# Patient Record
Sex: MALE | URBAN - METROPOLITAN AREA
[De-identification: names, ages, dates, MRNs, and addresses within clinical notes are randomized per-mention and may not be internally consistent; named-entity substitution may affect disease eponyms.]

---

## 2019-09-28 ENCOUNTER — HOSPITAL ENCOUNTER (OUTPATIENT)
Dept: MEDSURG UNIT | Facility: HOSPITAL | Age: 59
End: 2019-09-30
Attending: INTERNAL MEDICINE | Admitting: INTERNAL MEDICINE

## 2022-05-01 NOTE — HISTORICAL [facility]
This is a historical note converted from Ruth. Formatting and pictures may have been removed.  Please reference Ruth for original formatting and attached multimedia. Admit and Discharge Dates  Admit Date: 09/28/2019  Discharge Date: 09/30/2019  Physicians  Attending Physician - Daniel TRENT, Candido  Admitting Physician - Daniel TRENT, Candido  Primary Care Physician - No PCP, No  Discharge Diagnosis  1.?Acute streptococcal pharyngitis?J02.0  2.?Angioedema?T78.3XXA  3.?Weakness of right upper extremity?R29.898  4.?Allergic reaction - minor?[id number]  5.?Essential hypertension?I10  6.?Type 2 diabetes mellitus?E11.9  CVA (cerebrovascular accident)?I63.9  Surgical Procedures  No procedures recorded for this visit.  Immunizations  No immunizations recorded for this visit.  Hospital Course  57yo male admitted with swelling of his throat and weakness.? he was on an antibiotic when these symptoms started but they progressed causing him to come here for further evaluation.? He was + for Strept.? He was also c/o right arm weakness and an abnormal balance.? CT head was negative but an MRI of brain was + for a CVA.? MRI showed a left basal ganglia region with extension into the left periventricular region consistent with acute ischemia.? He does have h/i DM, Hyperlipidemia, and HTN.? His BP was accelerated upon admit. Apparently he has not been fully compliant with meds.? PT consulted and felt he was safe to return home.? They did instruct the patient on the proper exercises because he does not have insurance and therefore a rehab unit will not be possible.? he is cleared for discharge home today.  Time Spent on discharge  D/C=38mins  Objective  Vitals & Measurements  T:?36.7? ?C (Oral)? TMIN:?36.6? ?C (Oral)? TMAX:?36.8? ?C (Oral)? HR:?70(Peripheral)? RR:?20? BP:?128/74? SpO2:?99%?  Physical Exam  General: ?Alert and oriented, No acute distress. ?  ??????? ? Appearance: Well nourished. ?  ??????? ?  Behavior: Appropriate. ?  ??????? ? Skin: Normal for ethnicity. ?  ?????Eye: ?Pupils are equal, round and reactive to light, Extraocular movements are intact. ?  ?????HENT: ?Normocephalic, Normal hearing, Oral mucosa is moist, No pharyngeal erythema. ?  ?????Neck: ?Supple, Non-tender, No carotid bruit, No jugular venous distention, No lymphadenopathy, No thyromegaly. ?  ?????Respiratory: ?Lungs are clear to auscultation, Breath sounds are equal, No chest wall tenderness. ?  ?????Cardiovascular: ?Normal rate, Regular rhythm, No murmur, No gallop, Good pulses equal in all extremities, Normal peripheral perfusion, No edema. ?  ?????Gastrointestinal: ?Soft, Non-tender, Non-distended, Normal bowel sounds, No organomegaly. ?obese  ?????Genitourinary: ?No costovertebral angle tenderness, No urethral discharge. ?  ?????Lymphatics: ?No lymphadenopathy neck, axilla, groin. ?  ?????Musculoskeletal: ?Normal range of motion, Normal strength, No tenderness, No swelling, abnormal gait. ?weakness to right(neglect)  ?????Integumentary: ?Warm, Dry, Pink, Intact, No rash. ?  ?????Neurologic: ?Alert, Oriented, Normal sensory, Normal motor function, No focal deficits, Cranial Nerves II-XII are grossly intact. ?  ?????Psychiatric: ?Cooperative, Appropriate mood & affect, poor judgment. ???  Patient Discharge Condition  stable  Discharge Disposition  home   Discharge Medication Reconciliation  Prescribed  aspirin (aspirin 325 mg oral tablet)?325 mg, Oral, Daily  atorvastatin (atorvastatin 80 mg oral tablet)?80 mg, Oral, Daily  clindamycin (clindamycin 150 mg oral capsule)?300 mg, Oral, q6hr  enalapril (enalapril 10 mg oral tablet)?10 mg, Oral, BID  metoprolol (metoprolol succinate 25 mg oral tablet, extended release)?25 mg, Oral, BID  predniSONE (prednisONE 20 mg oral tablet)?20 mg, Oral, Daily  Continue  glimepiride (glimepiride 4 mg oral tablet)?4 mg, Oral, BID  metFORMIN (metformin 500 mg oral tablet)?500 mg, Oral,  BID  Discontinue  enalapril (enalapril 10 mg oral tablet)?10 mg, Oral, BID  Education and Orders Provided  Angioedema, Easy-to-Read(Japanese)  Diabetes Mellitus and Standards of Medical Care  Stroke Prevention  Discharge - 09/30/19 13:42:00 CDT, Home?  Discharge Diet - Diabetic?  Discharge Activity - Activity as Tolerated?  Follow up  pcp in 1-2 weeks

## 2022-05-01 NOTE — HISTORICAL [facility]
This is a historical note converted from Ruth. Formatting and pictures may have been removed.  Please reference Ruth for original formatting and attached multimedia. Chief Complaint  Feeling weak and has a swollen tongue. ?Pt was [place] by EMS [place] stop  History of Present Illness  50-year-old male with past medical history of?type 2 diabetes and hypertension?presented to the ER by EMS?for?sore throat and facial swelling. ?H&P?obtained from chart review?and ?as patient has labored barrier. ?He recently started on amoxicillin?by his PCP?after he was discovered to have strep earlier this week. ?Symptoms began 24 to 48 hours after the start of the amoxicillin.? He states he did not have any problems with penicillin the past?and that he is been on his enalapril for over 5 years.? He denies any subjective fever, chills, or airway compromise.? He denies any tobacco use. ?ED work-up confirmed strep pharyngitis. ?Imaging cannot confirm tongue swelling?but did show some hyperaeration and some mild cardiomegaly. ?Started IV steroids, Benadryl, and Pepcid.? He will be admitted for allergic reaction as well as IV antibiotics for strep pharyngitis.  Review of Systems  Constitutional symptoms: ?Reviewed and negative except as documented in HPI.?  Skin symptoms:? Reviewed and negative except as documented in HPI.?  Eye symptoms:? Reviewed and negative except as documented in HPI.?  ENMT symptoms:? Reviewed and negative except as documented in HPI.  Respiratory symptoms:? Reviewed and negative except as documented in HPI.  Cardiovascular symptoms:? Reviewed and negative except as documented in HPI.  Gastrointestinal symptoms:? Reviewed and negative except as documented in HPI.  Genitourinary symptoms:? Reviewed and negative except as documented in HPI.  Musculoskeletal symptoms: Reviewed and negative except as documented in HPI.  Neurologic symptoms:? Reviewed and negative except as documented in  HPI.  Psychiatric symptoms: Reviewed and negative except as documented in HPI.  Hematologic symptoms: Reviewed and negative except as documented in HPI.  ?  Additional review of systems information: All other systems reviewed and otherwise negative.  Physical Exam  Vitals & Measurements  T:?36.6? ?C (Oral)? TMIN:?36.6? ?C (Oral)? TMAX:?37.2? ?C (Oral)? HR:?79(Monitored)? RR:?18? BP:?141/79? SpO2:?98%?  GEN: Patient alert. Well developed and well nourished male,?looks uncomfortable from pain?but no acute distress  Eyes: No scleral icterus, EOMI, No conjunctival injection or pallor  HENT: NCAT, OMM, neck supple. + Pharyngeal injection  LYMPH:?+ cervical adenopathy  CV:?+ Sinus tach,. No M/R/G, No JVD noted. Peripheral pulses palpable  RESP: Symmetrical rise and fall of chest. Breathing unlabored. CTA B/L. No wheezing, rhonchi or crackles  ABD: Normoactive BS noted in all 4 quadrants, soft, NT, ND  : No CVA or suprapubic TTP.  MSK/EXT: No gross deformities noted, MAEW  SKIN: Normal color for ethnicity, dry, warm, normal turgor  NEURO: GCS of 15, CN II through XII grossly intact, sensation and strength of extremities grossly normal.  PSYCH: Mood and affect appropriate, good judgement, cooperative with exam  Assessment/Plan  1.?Acute streptococcal pharyngitis?J02.0  2.?Angioedema?T78.3XXA  3.?Weakness of right upper extremity?R29.898  4.?Allergic reaction - minor?[id number]  5.?Essential hypertension?I10  6.?Type 2 diabetes mellitus?E11.9  Admit to inpatient service today  Continue IV steroids, benadryl, and pepcid  Pain control with magic mouthwash  Repeat imaging if any new focal deficits  DVT ppx  Trend labs  SSI with accuchecks  F/u on Hgb A1c  ?   Problem List/Past Medical History  Ongoing  No qualifying data  Historical  Hypertension  Medications  Inpatient  acetaminophen, 650 mg= 2 tab(s), Oral, q6hr, PRN  acetaminophen, 1000 mg= 2 tab(s), Oral, q6hr, PRN  aspirin 325 mg oral tablet,  325 mg= 1 tab(s), Oral, Daily  atorvastatin, 40 mg= 1 tab(s), Oral, Daily  Benadryl, 25 mg= 0.5 mL, IV Push, TID  clindamycin 600mg/NS 50mL (premix), 600 mg= 50 mL, IV Piggyback, q8hr  Dextrose 50% and Water (50 mL vial/syringe), 12.5 gm= 25 mL, IV Push, Once, PRN  Dextrose 50% and Water (50 mL vial/syringe), 12.5 gm= 25 mL, IV Push, As Directed, PRN  Dextrose 50% and Water (50 mL vial/syringe), 25 gm= 50 mL, IV Push, As Directed, PRN  Dextrose 50% in Water intravenous solution, 12.5 gm= 25 mL, IV Push, As Directed, PRN  enalapril, 10 mg= 1 tab(s), Oral, BID  glucagon, 1 mg= 1 EA, IM, q10min, PRN  glucagon, 1 mg= 1 EA, IM, q10min, PRN  hydrALAZINE (Apresoline) Inj., 10 mg= 0.5 mL, IV Push, q4hr, PRN  insulin lispro, 2-14 units, Subcutaneous, As Directed, PRN  lactobacillus acidophilus and bulgaricus oral tablet, chewable, 1 tab(s), Oral, TID  Lantus 100 U/mL (per 1 unit), 5 units= 0.05 mL, Subcutaneous, At Bedtime  Lidocaine Viscous, 5 mL, Oral, q6hr  metoprolol succinate 25 mg oral tablet, extended release, 25 mg= 1 tab(s), Oral, BID  Pepcid (for IV Push), 20 mg= 2 mL, IV, BID  Racemic Epinephrine, 0.5 mL, NEB, q4hr  Solumedrol IV push / IM, 20 mg= 0.5 mL, IV Push, [id number]  Zofran, 4 mg= 2 mL, IV Push, q4hr, PRN  Home  enalapril 10 mg oral tablet, 10 mg= 1 tab(s), Oral, BID  glimepiride 4 mg oral tablet, 4 mg= 1 tab(s), Oral, BID  metformin 500 mg oral tablet, 500 mg= 1 tab(s), Oral, BID  Allergies  amoxicillin-clavulanate?(Angioedema of tongue, Rash)  Social History  Abuse/Neglect  No, 09/28/2019  Alcohol  Never, 09/28/2019  Home/Environment  Lives with Children, Spouse., 09/28/2019  Substance Use  Never, 09/28/2019  Tobacco  Never (less than 100 in lifetime), No, 09/28/2019  Lab Results  Test Name Test Result Date/Time   Sodium Lvl 137 mmol/L 09/28/2019 01:41 CDT   Potassium Lvl 3.6 mmol/L 09/28/2019 01:41 CDT   Chloride 102 mmol/L 09/28/2019 01:41 CDT   CO2 28 mmol/L 09/28/2019 01:41 CDT   Glucose Lvl 153  mg/dL (High) 09/28/2019 01:41 CDT   BUN 20 mg/dL 09/28/2019 01:41 CDT   Creatinine 0.94 mg/dL 09/28/2019 01:41 CDT   INR 1.0 09/28/2019 01:41 CDT   WBC 11.7 x10(3)/mcL (High) 09/28/2019 01:41 CDT   Hgb 13.3 gm/dL (Low) 09/28/2019 01:41 CDT   Hct 41.6 % (Low) 09/28/2019 01:41 CDT   Platelet 399 x10(3)/mcL 09/28/2019 01:41 CDT   Strep A PCR DETECTED (Abnormal) 09/28/2019 03:12 CDT   Diagnostic Results  (09/28/2019 01:38 CDT XR Soft Tissue Neck)  Reason For Exam  Tongue swelling;Other (please specify)  ?  Radiology Report  SOFT TISSUE X-RAYS OF NECK (AP and lateral):  ?  CPT: 84090  ?  History:  Respiratory distress with tongue swelling with allergic reaction. No  prior study available for comparison.  ?  Findings:  Evaluation of her tongue size cannot be accurately made with the  patients mouth closed. Oropharynx patency at the level of the tongue  cannot be confirmed by this exam, but there is patency of the  nasopharynx to posterior oropharynx and hypopharynx, larynx and upper  trachea. No radiopaque foreign body or prevertebral soft tissue  swelling is present.  ?  IMPRESSION:  Evaluation of her tongue size cannot be accurately made with the  patients mouth closed. Oropharynx patency at the level of the tongue  cannot be confirmed by this exam, but there is patency of the  nasopharynx to posterior oropharynx and hypopharynx, larynx and upper  trachea.? [1]  ?  (09/28/2019 01:38 CDT XR Chest 1 View)  Reason For Exam  Tongue swelling;Other (please specify)  ?  Radiology Report  ?  CHEST X-RAY (AP view):  ?  CPT: 92026  ?  History:  Respiratory distress with tongue swelling with allergic reaction. No  prior study available for comparison.  ?  Findings:?  The lungs are hypoaerated with patchy alveolar opacities in the left  lung base. No effusion or pneumothorax is seen. ? ?The heart is mostly  obscured, and cardiomegaly cannot be excluded. The central pulmonary  vessels and mediastinum are normal in size and are  grossly  unremarkable. Except for degenerative changes, the visualized osseous  structures are grossly unremarkable.  ?  IMPRESSION:  1. Hypoaeration with minimal patchy alveolar opacities in the left  lung base. These alveolar opacities may just be subsegmental  atelectasis, but concomitant pneumonitis/pneumonia cannot be excluded.  2. The heart is mostly obscured, and cardiomegaly cannot be excluded. [2]     [1]?XR Soft Tissue Neck; David Lloyd MD 09/28/2019 01:38 CDT  [2]?XR Chest 1 View; David Lloyd MD 09/28/2019 01:38 CDT   Admit?under 23 hour?observation status